# Patient Record
Sex: FEMALE | Race: OTHER | RURAL
[De-identification: names, ages, dates, MRNs, and addresses within clinical notes are randomized per-mention and may not be internally consistent; named-entity substitution may affect disease eponyms.]

---

## 2021-11-29 NOTE — PATIENT DISCUSSION
03/24/2021OS-[id number]&nbsp;SN &nbsp; &nbsp; bjb Last appointment: 9/21/2021  Next appointment: 12/16/2021  Last refill: 9/21/2021, 90 +1      [address]

## 2022-10-18 ENCOUNTER — EMERGENCY VISIT (OUTPATIENT)
Dept: RURAL CLINIC 1 | Facility: CLINIC | Age: 42
End: 2022-10-18

## 2022-10-18 DIAGNOSIS — H11.31: ICD-10-CM

## 2022-10-18 PROCEDURE — 99212 OFFICE O/P EST SF 10 MIN: CPT

## 2022-10-18 ASSESSMENT — TONOMETRY
OD_IOP_MMHG: 16
OS_IOP_MMHG: 16

## 2022-10-18 ASSESSMENT — VISUAL ACUITY
OD_SC: 20/25
OU_SC: 20/20
OS_SC: 20/20
OU_SC: 20/20-1
OD_SC: 20/20
OS_SC: 20/30